# Patient Record
(demographics unavailable — no encounter records)

---

## 2024-11-18 NOTE — DISCUSSION/SUMMARY
[FreeTextEntry1] : He is an 83-year-old former smoker with a history of hypertension, hyperlipidemia, coronary artery disease, S/P TAVR, S/P repair of perforated colon in 2021 presented in June 2017 with a cough, mucus production and wheezing. Infiltrates noted on Chest CT.The infiltrates did not resolve with antibiotics and steroids. Bronchoscopy 3/9/18. Copious and thick secretions. BAL was remarkable for acute inflammatory cells.  AFB smears negative. VATS/lung biopsy December 2018 demonstrated fibrosing pneumonitis with non-necrotizing granulomata. Chronic hypersensitivity pneumonitis was a consideration, he had multiple humidifiers at home. He removed them and his condition improved somewhat. Then developed visual symptoms and persistent eosinophilia and an elevated IgE. He was diagnosed with EGPA and was placed on Nucala and improved. Found to have AS, S/P TAVR January 2021 at [facility]. CT Chest 2/10/21: Bilateral ground glass opacities resolved. CT Chest 3/3/22: Pulmonary fibrosis without changes from 2/8/21. S/P PPM April 2022 for symptomatic bradycardia. CT Chest 4/14/23: Pulmonary fibrosis unchanged from 10/21/22. CT Chest 9/21/23: Stable opacities consistent with pulmonary fibrosis.  Impression Status post back surgery July 2024 -Condition improved, ambulating with 2 canes Chronic progressive fibrosing interstitial lung disease -suspected EGPA  -improved on mycophenolate, prednisone 7.5 mg and Nucala  Pulmonary fibrosis -on Ofev started in November 2023  Recommend Continue with Nucala, prednisone 7.5 mg, mycophenolate and Ofev Obtain blood work Follow-up CT Follow-up with rheumatology and cardiology Influenza and pneumonia vaccination I will see him again in 3 months

## 2024-11-18 NOTE — HISTORY OF PRESENT ILLNESS
[Former] : former [Never] : never [Difficulty Maintaining Sleep] : difficulty maintaining sleep [TextBox_4] : He had back surgery in July.  He stopped the Ofev and mycophenolate at that time.  He is now on prednisone 7.5 mg/day.  He remains on Nucala, Ofev and mycophenolate.  He complains of dry cough.  He is no longer in a wheelchair and walks with canes. [Difficulty Initiating Sleep] : does not have difficulty initiating sleep [Fatigue] : no fatigue [Snoring] : no snoring

## 2024-11-18 NOTE — REVIEW OF SYSTEMS
[Cough] : cough [Back Pain] : ~T back pain [Rheumatologic] : ~T rheumatologic disorder [Vasculitis] : ~T vasculitides [Fatigue] : no fatigue [Eye Irritation] : no ~T irritation of the eyes [Ear Disturbance] : no ear disturbance [Epistaxis] : no nosebleeds [Sputum] : not coughing up ~M sputum [Hemoptysis] : no hemoptysis [Dyspnea] : no dyspnea [PND] : no PND [Palpitations] : no palpitations [Nasal Discharge] : no nasal discharge [Heartburn] : no heartburn [Dysuria] : no dysuria [Myalgias] : no myalgias [Raynaud] : no Raynaud's phenomenon was observed [Rash] : no [unfilled] rash [Anemia] : no anemia [Dizziness] : no dizziness [Syncope] : no fainting [Anxiety] : no anxiety [Diabetes] : no diabetes mellitus [Thyroid Problem] : no thyroid problem

## 2024-11-18 NOTE — PROCEDURE
[FreeTextEntry1] : PFT 5/9/18: There was no obstructive airways disease.There is mild restriction and a mild reduction in diffusion.  PFT 1/23/19: Spirometry was normal. Mild restriction noted. There was a mild reduction in diffusion.  PFT 4/23/19: Spirometry was normal. Lung volumes were normal. Diffusion was mildly reduced.DLCO was 15.6 compared to 13.9 on the previous study.   PFT 2/5/20: Spirometry was normal. Lung volumes were normal. Diffusion was moderately reduced.   PFT 6/2/23: No obstruction. Severe restriction. Moderate reduction in diffusion. No significant improvement after bronchodilator.   PFT 12/13/23: No obstruction.  Moderate restriction.  Moderate reduction in diffusion.  No significant change after bronchodilator.  PFT 6/6/24: No obstruction.  FEV1 was 1.77 L.  FVC was 1.96 L.  Stable when compared to the prior study.  No significant change after bronchodilator.  Exercise oximetry 6/22/22: Desaturation to 85% on room air was noted after 1 minute of exercise.  Further exercise could not be attempted.  CT Chest 2/27/18: Patchy infiltrate in the left lower lobe and tree in bud nodularity in the right upper lobe and lingula. There were also some subtle pulmonary nodules.  CT Chest 9/5/18: Subpleural interstitial disease, bronchiectasis, ground glass and stable lymphadenopathy.  CT Chest 12/4/18: Ground glass opacity in the right lung noted. No mass. No consolidation. Mildly enlarged mediastinal lymph nodes. Hypersensitivity pneumonitis suspected.  CT Chest 6/7/19 (([facility]): Improvement in the infiltrates and ground glass opacities in the right lower lobe was noted. Mild scarring and  traction bronchiectasis was present. This was most pronounced in the upper lobes.  CT Chest 2/16/20 (at [facility]): Left upper lobe ground glass opacity noted. Interstitial fibrosis also noted. Cardiomegaly. Calcified aortic valve.  CT Chest 12/22/20 ([facility]): Extensive mediastinal and hilar adenopathy (see report). 10 mm RUL nodule. Interstitial disease noted.   CT Chest 2/10/21: Bilateral ground glass opacities resolved.   CT Chest 3/3/22: Pulmonary fibrosis without change from 2/8/21.   CT Chest 10/21/22: Pulmonary fibrosis, consistent with UIP.  CT Chest 4/14/23: Pulmonary fibrosis unchanged from 10/21/22.   CT Chest 9/21/23: Stable opacities consistent with pulmonary fibrosis.  Echo 5/18/23: EF 56%. Mild pulmonary hypertension suspected.

## 2024-11-18 NOTE — PROCEDURE
[FreeTextEntry1] : PFT 5/9/18: There was no obstructive airways disease.There is mild restriction and a mild reduction in diffusion.  PFT 1/23/19: Spirometry was normal. Mild restriction noted. There was a mild reduction in diffusion.  PFT 4/23/19: Spirometry was normal. Lung volumes were normal. Diffusion was mildly reduced.DLCO was 15.6 compared to 13.9 on the previous study.   PFT 2/5/20: Spirometry was normal. Lung volumes were normal. Diffusion was moderately reduced.   PFT 6/2/23: No obstruction. Severe restriction. Moderate reduction in diffusion. No significant improvement after bronchodilator.   PFT 12/13/23: No obstruction.  Moderate restriction.  Moderate reduction in diffusion.  No significant change after bronchodilator.  PFT 6/6/24: No obstruction.  FEV1 was 1.77 L.  FVC was 1.96 L.  Stable when compared to the prior study.  No significant change after bronchodilator.  Exercise oximetry 6/22/22: Desaturation to 85% on room air was noted after 1 minute of exercise.  Further exercise could not be attempted.  CT Chest 2/27/18: Patchy infiltrate in the left lower lobe and tree in bud nodularity in the right upper lobe and lingula. There were also some subtle pulmonary nodules.  CT Chest 9/5/18: Subpleural interstitial disease, bronchiectasis, ground glass and stable lymphadenopathy.  CT Chest 12/4/18: Ground glass opacity in the right lung noted. No mass. No consolidation. Mildly enlarged mediastinal lymph nodes. Hypersensitivity pneumonitis suspected.  CT Chest 6/7/19 (([facility]): Improvement in the infiltrates and ground glass opacities in the right lower lobe was noted. Mild scarring and  traction bronchiectasis was present. This was most pronounced in the upper lobes.  CT Chest 2/16/20 (at [facility]): Left upper lobe ground glass opacity noted. Interstitial fibrosis also noted. Cardiomegaly. Calcified aortic valve.  CT Chest 12/22/20 ([facility]): Extensive mediastinal and hilar adenopathy (see report). 10 mm RUL nodule. Interstitial disease noted.   CT Chest 2/10/21: Bilateral ground glass opacities resolved.   CT Chest 3/3/22: Pulmonary fibrosis without change from 2/8/21.   CT Chest 10/21/22: Pulmonary fibrosis, consistent with UIP.  CT Chest 4/14/23: Pulmonary fibrosis unchanged from 10/21/22.   CT Chest 9/21/23: Stable opacities consistent with pulmonary fibrosis.  Echo 5/18/23: EF 56%. Mild pulmonary hypertension suspected.

## 2024-11-18 NOTE — DISCUSSION/SUMMARY
[FreeTextEntry1] : He is an 83-year-old former smoker with a history of hypertension, hyperlipidemia, coronary artery disease, S/P TAVR, S/P repair of perforated colon in 2021 presented in June 2017 with a cough, mucus production and wheezing. Infiltrates noted on Chest CT.The infiltrates did not resolve with antibiotics and steroids. Bronchoscopy 3/9/18. Copious and thick secretions. BAL was remarkable for acute inflammatory cells.  AFB smears negative. VATS/lung biopsy December 2018 demonstrated fibrosing pneumonitis with non-necrotizing granulomata. Chronic hypersensitivity pneumonitis was a consideration, he had multiple humidifiers at home. He removed them and his condition improved somewhat. Then developed visual symptoms and persistent eosinophilia and an elevated IgE. He was diagnosed with EGPA and was placed on Nucala and improved. Found to have AS, S/P TAVR January 2021 at [facility]. CT Chest 2/10/21: Bilateral ground glass opacities resolved. CT Chest 3/3/22: Pulmonary fibrosis without changes from 2/8/21. S/P PPM April 2022 for symptomatic bradycardia. CT Chest 4/14/23: Pulmonary fibrosis unchanged from 10/21/22. CT Chest 9/21/23: Stable opacities consistent with pulmonary fibrosis.  Impression Status post back surgery July 2024 -Condition improved, ambulating with 2 canes Chronic progressive fibrosing interstitial lung disease -suspected EGPA  -improved on mycophenolate, prednisone 7.5 mg and Nucala  Pulmonary fibrosis -on Ofev started in November 2023  Recommend Continue with Nucala, prednisone 7.5 mg, mycophenolate and Ofev Obtain blood work Follow-up CT Follow-up with rheumatology and cardiology Influenza and pneumonia vaccination I will see him again in 3 months

## 2024-11-18 NOTE — PHYSICAL EXAM
[General Appearance - Well Developed] : well developed [Well Groomed] : well groomed [General Appearance - In No Acute Distress] : no acute distress [Heart Rate And Rhythm] : heart rate and rhythm were normal [Heart Sounds] : normal S1 and S2 [Edema] : no peripheral edema present [Exaggerated Use Of Accessory Muscles For Inspiration] : no accessory muscle use [Abdomen Tenderness] : non-tender [Cyanosis, Localized] : no localized cyanosis [Skin Turgor] : normal skin turgor [] : no rash [No Focal Deficits] : no focal deficits [Oriented To Time, Place, And Person] : oriented to person, place, and time [FreeTextEntry1] : Scattered rhonchi and dry crackles.  No wheezing.